# Patient Record
Sex: FEMALE | Race: WHITE | NOT HISPANIC OR LATINO | URBAN - METROPOLITAN AREA
[De-identification: names, ages, dates, MRNs, and addresses within clinical notes are randomized per-mention and may not be internally consistent; named-entity substitution may affect disease eponyms.]

---

## 2020-06-26 ENCOUNTER — OFFICE VISIT (OUTPATIENT)
Dept: URBAN - METROPOLITAN AREA MEDICAL CENTER 14 | Facility: MEDICAL CENTER | Age: 75
End: 2020-06-26

## 2020-07-10 ENCOUNTER — OFFICE VISIT (OUTPATIENT)
Dept: URBAN - METROPOLITAN AREA CLINIC 63 | Facility: CLINIC | Age: 75
End: 2020-07-10

## 2020-07-14 ENCOUNTER — OFFICE VISIT (OUTPATIENT)
Dept: URBAN - METROPOLITAN AREA MEDICAL CENTER 14 | Facility: MEDICAL CENTER | Age: 75
End: 2020-07-14

## 2020-07-29 ENCOUNTER — OFFICE VISIT (OUTPATIENT)
Dept: URBAN - METROPOLITAN AREA CLINIC 63 | Facility: CLINIC | Age: 75
End: 2020-07-29

## 2020-09-30 ENCOUNTER — OFFICE VISIT (OUTPATIENT)
Dept: URBAN - METROPOLITAN AREA CLINIC 63 | Facility: CLINIC | Age: 75
End: 2020-09-30

## 2020-10-29 ENCOUNTER — OFFICE VISIT (OUTPATIENT)
Dept: URBAN - METROPOLITAN AREA TELEHEALTH 2 | Facility: TELEHEALTH | Age: 75
End: 2020-10-29

## 2020-11-17 ENCOUNTER — OFFICE VISIT (OUTPATIENT)
Dept: URBAN - METROPOLITAN AREA SURGERY CENTER 4 | Facility: SURGERY CENTER | Age: 75
End: 2020-11-17

## 2020-12-02 ENCOUNTER — OFFICE VISIT (OUTPATIENT)
Dept: URBAN - METROPOLITAN AREA CLINIC 63 | Facility: CLINIC | Age: 75
End: 2020-12-02

## 2022-07-09 ENCOUNTER — TELEPHONE ENCOUNTER (OUTPATIENT)
Dept: URBAN - METROPOLITAN AREA CLINIC 121 | Facility: CLINIC | Age: 77
End: 2022-07-09

## 2022-07-09 RX ORDER — LISINOPRIL 10 MG/1
TABLET ORAL
Refills: 0 | OUTPATIENT
Start: 2020-01-30 | End: 2020-01-30

## 2022-07-09 RX ORDER — LEVOTHYROXINE SODIUM 125 UG/1
TABLET ORAL
Refills: 0 | OUTPATIENT
Start: 2018-08-22 | End: 2020-01-30

## 2022-07-09 RX ORDER — CYCLOBENZAPRINE HYDROCHLORIDE 5 MG/1
TABLET, FILM COATED ORAL THREE TIMES A DAY
Refills: 0 | OUTPATIENT
Start: 2018-08-22 | End: 2020-01-30

## 2022-07-09 RX ORDER — CLOPIDOGREL 75 MG/1
TABLET ORAL
Refills: 0 | OUTPATIENT
Start: 2020-01-30 | End: 2020-01-30

## 2022-07-09 RX ORDER — LORAZEPAM 0.5 MG/1
TABLET ORAL
Refills: 0 | OUTPATIENT
Start: 2020-01-30 | End: 2020-01-30

## 2022-07-09 RX ORDER — CARVEDILOL 6.25 MG/1
TABLET, FILM COATED ORAL TWICE A DAY
Refills: 0 | OUTPATIENT
Start: 2020-01-30 | End: 2020-01-30

## 2022-07-09 RX ORDER — ISOSORBIDE MONONITRATE 30 MG/1
TABLET, EXTENDED RELEASE ORAL
Refills: 0 | OUTPATIENT
Start: 2018-08-22 | End: 2020-01-30

## 2022-07-09 RX ORDER — LEVOTHYROXINE SODIUM 125 UG/1
TABLET ORAL
Refills: 0 | OUTPATIENT
Start: 2020-01-30 | End: 2020-01-30

## 2022-07-09 RX ORDER — ASPIRIN 81 MG/1
TABLET, FILM COATED ORAL
Refills: 0 | OUTPATIENT
Start: 2018-08-22 | End: 2020-01-30

## 2022-07-09 RX ORDER — LORAZEPAM 0.5 MG/1
TABLET ORAL
Refills: 0 | OUTPATIENT
Start: 2018-08-22 | End: 2020-01-30

## 2022-07-09 RX ORDER — FUROSEMIDE 20 MG/1
TABLET ORAL
Refills: 0 | OUTPATIENT
Start: 2020-01-30 | End: 2020-01-30

## 2022-07-09 RX ORDER — NITROGLYCERIN 0.4 MG/1
TABLET SUBLINGUAL
Refills: 0 | OUTPATIENT
Start: 2018-08-22 | End: 2020-01-30

## 2022-07-09 RX ORDER — ROSUVASTATIN CALCIUM 40 MG/1
TABLET, FILM COATED ORAL
Refills: 0 | OUTPATIENT
Start: 2018-08-22 | End: 2020-01-30

## 2022-07-09 RX ORDER — CARVEDILOL 6.25 MG/1
TABLET, FILM COATED ORAL TWICE A DAY
Refills: 0 | OUTPATIENT
Start: 2018-08-22 | End: 2020-01-30

## 2022-07-09 RX ORDER — FUROSEMIDE 20 MG/1
TABLET ORAL
Refills: 0 | OUTPATIENT
Start: 2018-08-22 | End: 2020-01-30

## 2022-07-09 RX ORDER — LISINOPRIL 10 MG/1
TABLET ORAL
Refills: 0 | OUTPATIENT
Start: 2018-08-22 | End: 2020-01-30

## 2022-07-09 RX ORDER — EZETIMIBE 10 MG/1
TABLET ORAL
Refills: 0 | OUTPATIENT
Start: 2018-08-22 | End: 2020-01-30

## 2022-07-09 RX ORDER — TRAZODONE HYDROCHLORIDE 100 MG/1
TABLET ORAL
Refills: 0 | OUTPATIENT
Start: 2020-01-30 | End: 2020-01-30

## 2022-07-09 RX ORDER — CLOPIDOGREL 75 MG/1
TABLET ORAL
Refills: 0 | OUTPATIENT
Start: 2018-08-22 | End: 2020-01-30

## 2022-07-09 RX ORDER — TRAZODONE HYDROCHLORIDE 100 MG/1
TABLET ORAL
Refills: 0 | OUTPATIENT
Start: 2018-08-22 | End: 2020-01-30

## 2022-07-09 RX ORDER — NAPROXEN SODIUM 220 MG/1
TABLET ORAL
Refills: 0 | OUTPATIENT
Start: 2018-08-22 | End: 2020-01-30

## 2022-07-09 RX ORDER — ELECTROLYTES/DEXTROSE
SOLUTION, ORAL ORAL
Refills: 0 | OUTPATIENT
Start: 2018-08-22 | End: 2020-01-30

## 2022-07-10 ENCOUNTER — TELEPHONE ENCOUNTER (OUTPATIENT)
Dept: URBAN - METROPOLITAN AREA CLINIC 121 | Facility: CLINIC | Age: 77
End: 2022-07-10

## 2022-07-10 RX ORDER — ESTROGEN,CON/M-PROGEST ACET 0.3-1.5MG
TABLET ORAL ONCE A DAY
Refills: 0 | Status: ACTIVE | COMMUNITY
Start: 2020-01-30

## 2022-07-10 RX ORDER — ROSUVASTATIN CALCIUM 40 MG/1
TABLET, FILM COATED ORAL
Refills: 0 | Status: ACTIVE | COMMUNITY
Start: 2020-01-30

## 2022-07-10 RX ORDER — EZETIMIBE 10 MG/1
TABLET ORAL
Refills: 0 | Status: ACTIVE | COMMUNITY
Start: 2020-01-30

## 2022-07-10 RX ORDER — LORAZEPAM 2 MG/1
TABLET ORAL ONCE A DAY
Refills: 0 | Status: ACTIVE | COMMUNITY
Start: 2020-01-30

## 2022-07-10 RX ORDER — ISOSORBIDE MONONITRATE 30 MG/1
TABLET, EXTENDED RELEASE ORAL
Refills: 0 | Status: ACTIVE | COMMUNITY
Start: 2020-01-30

## 2022-07-10 RX ORDER — SPIRONOLACTONE 25 MG/1
TABLET ORAL ONCE A DAY
Refills: 0 | Status: ACTIVE | COMMUNITY
Start: 2020-01-13

## 2022-07-10 RX ORDER — NITROGLYCERIN 0.4 MG/1
TABLET SUBLINGUAL
Refills: 0 | Status: ACTIVE | COMMUNITY
Start: 2020-01-30

## 2022-07-10 RX ORDER — LOSARTAN POTASSIUM 50 MG/1
TABLET, FILM COATED ORAL ONCE A DAY
Refills: 0 | Status: ACTIVE | COMMUNITY
Start: 2020-01-17

## 2022-07-10 RX ORDER — ASPIRIN 81 MG/1
TABLET, FILM COATED ORAL
Refills: 0 | Status: ACTIVE | COMMUNITY
Start: 2020-01-30

## 2022-07-10 RX ORDER — UMECLIDINIUM BROMIDE AND VILANTEROL TRIFENATATE 62.5; 25 UG/1; UG/1
POWDER RESPIRATORY (INHALATION) TAKE AS DIRECTED
Refills: 0 | Status: ACTIVE | COMMUNITY
Start: 2020-09-21

## 2022-07-10 RX ORDER — ICOSAPENT ETHYL 1000 MG/1
CAPSULE ORAL TWICE A DAY
Refills: 0 | Status: ACTIVE | COMMUNITY
Start: 2020-07-28

## 2022-07-10 RX ORDER — CARVEDILOL 12.5 MG/1
TABLET, FILM COATED ORAL TWICE A DAY
Refills: 0 | Status: ACTIVE | COMMUNITY
Start: 2020-01-30

## 2022-07-10 RX ORDER — ONDANSETRON HYDROCHLORIDE 4 MG/1
TAKE ONE TABLET 30 MINUTES BEFORE EACH BOTTLE OF MAGNESIUM CITRATE FOR COLONOSCOPY PREP TABLET, FILM COATED ORAL
Refills: 0 | Status: ACTIVE | COMMUNITY
Start: 2020-10-30

## 2022-07-10 RX ORDER — NAPROXEN SODIUM 220 MG/1
TABLET ORAL
Refills: 0 | Status: ACTIVE | COMMUNITY
Start: 2020-01-30

## 2022-07-10 RX ORDER — ELECTROLYTES/DEXTROSE
SOLUTION, ORAL ORAL
Refills: 0 | Status: ACTIVE | COMMUNITY
Start: 2020-01-30

## 2022-07-10 RX ORDER — ONDANSETRON HYDROCHLORIDE 4 MG/1
TAKE ONE TABLET 30 MINUTES BEFORE EACH BOTTLE OF MAGNESIUM CITRATE FOR COLONOSCOPY PREP TABLET, FILM COATED ORAL
Refills: 0 | Status: ACTIVE | COMMUNITY
Start: 2020-03-09

## 2022-07-10 RX ORDER — CYCLOBENZAPRINE HYDROCHLORIDE 5 MG/1
TABLET, FILM COATED ORAL THREE TIMES A DAY
Refills: 0 | Status: ACTIVE | COMMUNITY
Start: 2020-01-30

## 2022-07-10 RX ORDER — LEVOTHYROXINE SODIUM 125 UG/1
TABLET ORAL ONCE A DAY
Refills: 0 | Status: ACTIVE | COMMUNITY
Start: 2020-01-30

## 2022-07-10 RX ORDER — TRAZODONE HYDROCHLORIDE 150 MG/1
TABLET ORAL ONCE A DAY
Refills: 0 | Status: ACTIVE | COMMUNITY
Start: 2020-01-30

## 2022-10-19 ENCOUNTER — P2P PATIENT RECORD (OUTPATIENT)
Age: 77
End: 2022-10-19

## 2022-10-20 ENCOUNTER — TELEPHONE ENCOUNTER (OUTPATIENT)
Dept: URBAN - METROPOLITAN AREA CLINIC 63 | Facility: CLINIC | Age: 77
End: 2022-10-20

## 2022-11-11 ENCOUNTER — LAB OUTSIDE AN ENCOUNTER (OUTPATIENT)
Dept: URBAN - METROPOLITAN AREA CLINIC 63 | Facility: CLINIC | Age: 77
End: 2022-11-11

## 2022-11-11 ENCOUNTER — OFFICE VISIT (OUTPATIENT)
Dept: URBAN - METROPOLITAN AREA CLINIC 63 | Facility: CLINIC | Age: 77
End: 2022-11-11
Payer: COMMERCIAL

## 2022-11-11 ENCOUNTER — DASHBOARD ENCOUNTERS (OUTPATIENT)
Age: 77
End: 2022-11-11

## 2022-11-11 VITALS
OXYGEN SATURATION: 97 % | SYSTOLIC BLOOD PRESSURE: 110 MMHG | HEART RATE: 102 BPM | HEIGHT: 69 IN | DIASTOLIC BLOOD PRESSURE: 70 MMHG | WEIGHT: 139 LBS | BODY MASS INDEX: 20.59 KG/M2 | TEMPERATURE: 97.8 F

## 2022-11-11 DIAGNOSIS — K76.0 FATTY LIVER: ICD-10-CM

## 2022-11-11 DIAGNOSIS — R74.8 ABNORMAL LIVER ENZYMES: ICD-10-CM

## 2022-11-11 DIAGNOSIS — K31.9 MUCOSAL ABNORMALITY OF DUODENUM: ICD-10-CM

## 2022-11-11 DIAGNOSIS — Z86.010 PERSONAL HISTORY OF COLONIC POLYPS: ICD-10-CM

## 2022-11-11 DIAGNOSIS — F10.10 ALCOHOL ABUSE: ICD-10-CM

## 2022-11-11 DIAGNOSIS — K59.00 CONSTIPATION, UNSPECIFIED CONSTIPATION TYPE: ICD-10-CM

## 2022-11-11 DIAGNOSIS — Z12.11 COLON CANCER SCREENING: ICD-10-CM

## 2022-11-11 DIAGNOSIS — K21.9 GASTROESOPHAGEAL REFLUX DISEASE WITHOUT ESOPHAGITIS: ICD-10-CM

## 2022-11-11 PROBLEM — 266435005: Status: ACTIVE | Noted: 2022-11-10

## 2022-11-11 PROBLEM — 14760008: Status: ACTIVE | Noted: 2022-11-10

## 2022-11-11 PROBLEM — 197321007: Status: ACTIVE | Noted: 2022-11-10

## 2022-11-11 PROBLEM — 15167005: Status: ACTIVE | Noted: 2022-11-10

## 2022-11-11 PROCEDURE — 99214 OFFICE O/P EST MOD 30 MIN: CPT | Performed by: PHYSICIAN ASSISTANT

## 2022-11-11 RX ORDER — ROSUVASTATIN CALCIUM 40 MG/1
TABLET, FILM COATED ORAL
Refills: 0 | Status: ACTIVE | COMMUNITY
Start: 2020-01-30

## 2022-11-11 RX ORDER — EZETIMIBE 10 MG/1
TABLET ORAL
Refills: 0 | Status: ACTIVE | COMMUNITY
Start: 2020-01-30

## 2022-11-11 RX ORDER — NITROGLYCERIN 0.4 MG/1
TABLET SUBLINGUAL
Refills: 0 | Status: ACTIVE | COMMUNITY
Start: 2020-01-30

## 2022-11-11 RX ORDER — TRAZODONE HYDROCHLORIDE 150 MG/1
TABLET ORAL ONCE A DAY
Refills: 0 | Status: ACTIVE | COMMUNITY
Start: 2020-01-30

## 2022-11-11 RX ORDER — CARVEDILOL 6.25 MG/1
1 TABLET WITH FOOD TABLET, FILM COATED ORAL TWICE A DAY
Refills: 0 | Status: ACTIVE | COMMUNITY
Start: 2020-01-30

## 2022-11-11 RX ORDER — ESTROGEN,CON/M-PROGEST ACET 0.3-1.5MG
TABLET ORAL ONCE A DAY
Refills: 0 | Status: ACTIVE | COMMUNITY
Start: 2020-01-30

## 2022-11-11 RX ORDER — LEVOTHYROXINE SODIUM 125 UG/1
TABLET ORAL ONCE A DAY
Refills: 0 | Status: ACTIVE | COMMUNITY
Start: 2020-01-30

## 2022-11-11 RX ORDER — ICOSAPENT ETHYL 1000 MG/1
CAPSULE ORAL TWICE A DAY
Refills: 0 | Status: ACTIVE | COMMUNITY
Start: 2020-07-28

## 2022-11-11 RX ORDER — LORAZEPAM 2 MG/1
TABLET ORAL ONCE A DAY
Refills: 0 | Status: ACTIVE | COMMUNITY
Start: 2020-01-30

## 2022-11-11 RX ORDER — UMECLIDINIUM BROMIDE AND VILANTEROL TRIFENATATE 62.5; 25 UG/1; UG/1
POWDER RESPIRATORY (INHALATION) TAKE AS DIRECTED
Refills: 0 | Status: ACTIVE | COMMUNITY
Start: 2020-09-21

## 2022-11-11 RX ORDER — CYCLOBENZAPRINE HYDROCHLORIDE 5 MG/1
TABLET, FILM COATED ORAL THREE TIMES A DAY
Refills: 0 | Status: ACTIVE | COMMUNITY
Start: 2020-01-30

## 2022-11-11 NOTE — HPI-TODAY'S VISIT:
Faustina Mccain is a pleasant 77-year-old female who presents today for evaluation of elevated liver enzymes.  Accompanied by her daughter. Patient has had several ER presentations this year for alcoholic intoxication. In January of this year patient had a witnessed polymorphic VT arrest and defibrillator now in place. 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  History of noncompliance.  CT abdomen/pelvis with and without contrast dated 10/7/2022 showed intraluminal and location along the medial aspect of the second portion of the duodenum there is a small soft tissue density measuring approximately 11 mm.  This could represent redundant mucosa however a small ampullary lesion difficult to exclude.  Note that there is no dilatation of the common bile duct nor the main pancreatic duct.  Query constipation without fecal impaction.  17 mm low-density lesion in the left ovary.  Since this patient is postmenopausal further evaluation with pelvic ultrasound is recommended for greater characterization.  No evidence of any discrete mass or evidence to suggest metastatic disease to the abdomen or the pelvis.  Labs dated 9/26/2022 showed a normal renal function.  AST 41, ALT 38 but otherwise normal LFTs.  Normal thyroid panel.  Colonoscopy in June 2016 showed a 3 mm sessile cecal polyp.  15 mm sessile ascending colon polyp.  2 5 mm sessile transverse colon polyps.  8 mm sessile transverse colon polyp.  5 mm sessile descending colon polyp.  5 mm sessile descending colon polyp.  8 mm sessile descending colon polyp.  3 mm hyperplastic appearing sigmoid colon polyp.  5 mm sessile rectosigmoid colon polyp.  8 mm sessile rectal polyp.  8 mm sessile rectal polyp.  Internal hemorrhoids.  Marked sigmoid redundancy.  Tortuous and redundant colon.  Placed into a 1 year recall.  All polyps except for the sigmoid colon polyp and the rectal polyps (which were hyperplastic polyps) consistent with adenomatous  Ultrasound in November 2020 showed early fatty infiltration of the liver and no evidence of focal liver masses with recommended correlation with FibroScan imaging if clinically indicated  Patient's notes about 4-5 months ago quit alcohol use. Notes a bowel bowel movement every 2-3 days. Using Metamucil and MiraLAX PRN. Mentions 30-35 pound weight loss over the 2 months. Denies nausea, vomiting, heartburn, reflux, dysphagia, odynophagia, hematemesis, coffee ground emesis, abdominal pain, BRBPR or melena. Denies family history of colon cancer. No other GI complaints at this time. Admits to previously drinking 7+ [employer] per day for quite some time

## 2022-11-18 ENCOUNTER — LAB OUTSIDE AN ENCOUNTER (OUTPATIENT)
Dept: URBAN - METROPOLITAN AREA CLINIC 63 | Facility: CLINIC | Age: 77
End: 2022-11-18

## 2022-12-23 ENCOUNTER — TELEPHONE ENCOUNTER (OUTPATIENT)
Dept: URBAN - METROPOLITAN AREA CLINIC 63 | Facility: CLINIC | Age: 77
End: 2022-12-23

## 2023-01-09 ENCOUNTER — OFFICE VISIT (OUTPATIENT)
Dept: URBAN - METROPOLITAN AREA MEDICAL CENTER 3 | Facility: MEDICAL CENTER | Age: 78
End: 2023-01-09

## 2023-01-31 ENCOUNTER — OFFICE VISIT (OUTPATIENT)
Dept: URBAN - METROPOLITAN AREA CLINIC 63 | Facility: CLINIC | Age: 78
End: 2023-01-31

## 2023-02-03 ENCOUNTER — OFFICE VISIT (OUTPATIENT)
Dept: URBAN - METROPOLITAN AREA CLINIC 63 | Facility: CLINIC | Age: 78
End: 2023-02-03

## 2023-02-22 ENCOUNTER — OFFICE VISIT (OUTPATIENT)
Dept: URBAN - METROPOLITAN AREA CLINIC 63 | Facility: CLINIC | Age: 78
End: 2023-02-22

## 2023-03-08 ENCOUNTER — OFFICE VISIT (OUTPATIENT)
Dept: URBAN - METROPOLITAN AREA CLINIC 63 | Facility: CLINIC | Age: 78
End: 2023-03-08